# Patient Record
Sex: FEMALE | Employment: FULL TIME | URBAN - METROPOLITAN AREA
[De-identification: names, ages, dates, MRNs, and addresses within clinical notes are randomized per-mention and may not be internally consistent; named-entity substitution may affect disease eponyms.]

---

## 2024-03-01 ENCOUNTER — HOSPITAL ENCOUNTER (OUTPATIENT)
Dept: PREADMISSION TESTING | Age: 39
Discharge: HOME | End: 2024-03-05

## 2024-03-01 VITALS
RESPIRATION RATE: 14 BRPM | WEIGHT: 205.03 LBS | OXYGEN SATURATION: 99 % | SYSTOLIC BLOOD PRESSURE: 125 MMHG | HEIGHT: 71 IN | DIASTOLIC BLOOD PRESSURE: 84 MMHG | HEART RATE: 70 BPM | TEMPERATURE: 98.2 F | BODY MASS INDEX: 28.7 KG/M2

## 2024-03-01 NOTE — PRE-PROCEDURE INSTRUCTIONS
loose, comfortable clothing.  If you are potentially going to have a cast or brace bring clothing that will fit over them.                                                                                                          In case of illness - If you have cold or flu like symptoms (high fever, runny nose, sore throat, cough, etc.) rash, nausea, vomiting, loose stools, and/or recent contact with someone who has a contagious disease (chicken pox, measles, etc.) Please call your doctor before coming to the hospital.         Day of Surgery/Procedure:    As a patient at [facility] you can expect quality medical and nursing care that is centered on your individual needs.  Our goal is to make your surgical experience as comfortable as possible    .  Transportation After Your Surgery/Procedure:    You will need a friend or family member to drive you home after your procedure.  Your  must be 18 years of age or older and able to sign off on your discharge instructions.  A taxi cab or any other form of public transportation is not acceptable.  Your friend or family member must stay at the hospital throughout your procedure.    Someone must remain with you for the first 24 hours after your surgery if you receive anesthesia or medication.  If you do not have someone to stay with you, your procedure may be cancelled.      If you have any other questions regarding your procedure or the day of surgery, please call [phone]      _________________________  ____________________________  Signature (Patient)              Signature (Provider) & date

## 2024-03-15 ENCOUNTER — ANESTHESIA (OUTPATIENT)
Dept: OPERATING ROOM | Age: 39
End: 2024-03-15
Payer: COMMERCIAL

## 2024-03-15 ENCOUNTER — ANESTHESIA EVENT (OUTPATIENT)
Dept: OPERATING ROOM | Age: 39
End: 2024-03-15
Payer: COMMERCIAL

## 2024-03-15 ENCOUNTER — APPOINTMENT (OUTPATIENT)
Dept: GENERAL RADIOLOGY | Age: 39
End: 2024-03-15
Attending: PODIATRIST
Payer: COMMERCIAL

## 2024-03-15 ENCOUNTER — HOSPITAL ENCOUNTER (OUTPATIENT)
Age: 39
Setting detail: OUTPATIENT SURGERY
Discharge: HOME | End: 2024-03-15
Attending: PODIATRIST | Admitting: PODIATRIST
Payer: COMMERCIAL

## 2024-03-15 VITALS
HEIGHT: 71 IN | WEIGHT: 200 LBS | TEMPERATURE: 97.3 F | BODY MASS INDEX: 28 KG/M2 | RESPIRATION RATE: 11 BRPM | SYSTOLIC BLOOD PRESSURE: 121 MMHG | HEART RATE: 57 BPM | OXYGEN SATURATION: 96 % | DIASTOLIC BLOOD PRESSURE: 91 MMHG

## 2024-03-15 DIAGNOSIS — Z98.890 POST-OPERATIVE STATE: Primary | ICD-10-CM

## 2024-03-15 PROCEDURE — 2709999900 HC NON-CHARGEABLE SUPPLY: Performed by: PODIATRIST

## 2024-03-15 PROCEDURE — 2580000003 HC RX 258: Performed by: ANESTHESIOLOGY

## 2024-03-15 PROCEDURE — C1776 JOINT DEVICE (IMPLANTABLE): HCPCS | Performed by: PODIATRIST

## 2024-03-15 PROCEDURE — 2500000003 HC RX 250 WO HCPCS: Performed by: SPECIALIST

## 2024-03-15 PROCEDURE — 73630 X-RAY EXAM OF FOOT: CPT

## 2024-03-15 PROCEDURE — 7100000011 HC PHASE II RECOVERY - ADDTL 15 MIN: Performed by: PODIATRIST

## 2024-03-15 PROCEDURE — 3600000002 HC SURGERY LEVEL 2 BASE: Performed by: PODIATRIST

## 2024-03-15 PROCEDURE — 3600000012 HC SURGERY LEVEL 2 ADDTL 15MIN: Performed by: PODIATRIST

## 2024-03-15 PROCEDURE — 2500000003 HC RX 250 WO HCPCS: Performed by: PODIATRIST

## 2024-03-15 PROCEDURE — 3700000000 HC ANESTHESIA ATTENDED CARE: Performed by: PODIATRIST

## 2024-03-15 PROCEDURE — 2720000010 HC SURG SUPPLY STERILE: Performed by: PODIATRIST

## 2024-03-15 PROCEDURE — 81025 URINE PREGNANCY TEST: CPT

## 2024-03-15 PROCEDURE — 3700000001 HC ADD 15 MINUTES (ANESTHESIA): Performed by: PODIATRIST

## 2024-03-15 PROCEDURE — 7100000010 HC PHASE II RECOVERY - FIRST 15 MIN: Performed by: PODIATRIST

## 2024-03-15 PROCEDURE — 6360000002 HC RX W HCPCS: Performed by: PODIATRIST

## 2024-03-15 PROCEDURE — 6360000002 HC RX W HCPCS: Performed by: SPECIALIST

## 2024-03-15 DEVICE — 9.5MM X 18MM, TAPER POST, MTP    CE
Type: IMPLANTABLE DEVICE | Site: FOOT | Status: FUNCTIONAL
Brand: HEMICAP®

## 2024-03-15 DEVICE — 15MM ARTICULAR COMP,TOE2, 2.5MM X 4.5MM  CE
Type: IMPLANTABLE DEVICE | Site: FOOT | Status: FUNCTIONAL
Brand: HEMICAPDF®

## 2024-03-15 DEVICE — MODULAR INSERT, 1.5MM OFFSET-01(3.6MM THICK)
Type: IMPLANTABLE DEVICE | Site: FOOT | Status: FUNCTIONAL
Brand: HEMICAPDF-P®

## 2024-03-15 DEVICE — FIXATION COMPONENT , DF-P
Type: IMPLANTABLE DEVICE | Site: FOOT | Status: FUNCTIONAL
Brand: HEMICAPDF-P®

## 2024-03-15 RX ORDER — MIDAZOLAM HYDROCHLORIDE 1 MG/ML
INJECTION INTRAMUSCULAR; INTRAVENOUS PRN
Status: DISCONTINUED | OUTPATIENT
Start: 2024-03-15 | End: 2024-03-15 | Stop reason: SDUPTHER

## 2024-03-15 RX ORDER — SODIUM CHLORIDE 0.9 % (FLUSH) 0.9 %
5-40 SYRINGE (ML) INJECTION EVERY 12 HOURS SCHEDULED
Status: DISCONTINUED | OUTPATIENT
Start: 2024-03-15 | End: 2024-03-15 | Stop reason: HOSPADM

## 2024-03-15 RX ORDER — LIDOCAINE HYDROCHLORIDE 20 MG/ML
INJECTION, SOLUTION EPIDURAL; INFILTRATION; INTRACAUDAL; PERINEURAL PRN
Status: DISCONTINUED | OUTPATIENT
Start: 2024-03-15 | End: 2024-03-15 | Stop reason: SDUPTHER

## 2024-03-15 RX ORDER — NALOXONE HYDROCHLORIDE 0.4 MG/ML
INJECTION, SOLUTION INTRAMUSCULAR; INTRAVENOUS; SUBCUTANEOUS PRN
Status: DISCONTINUED | OUTPATIENT
Start: 2024-03-15 | End: 2024-03-15 | Stop reason: HOSPADM

## 2024-03-15 RX ORDER — SODIUM CHLORIDE 9 MG/ML
INJECTION, SOLUTION INTRAVENOUS PRN
Status: DISCONTINUED | OUTPATIENT
Start: 2024-03-15 | End: 2024-03-15 | Stop reason: HOSPADM

## 2024-03-15 RX ORDER — HYDROMORPHONE HYDROCHLORIDE 1 MG/ML
0.5 INJECTION, SOLUTION INTRAMUSCULAR; INTRAVENOUS; SUBCUTANEOUS EVERY 5 MIN PRN
Status: DISCONTINUED | OUTPATIENT
Start: 2024-03-15 | End: 2024-03-15 | Stop reason: HOSPADM

## 2024-03-15 RX ORDER — LIDOCAINE HYDROCHLORIDE 10 MG/ML
1 INJECTION, SOLUTION EPIDURAL; INFILTRATION; INTRACAUDAL; PERINEURAL
Status: DISCONTINUED | OUTPATIENT
Start: 2024-03-15 | End: 2024-03-15 | Stop reason: HOSPADM

## 2024-03-15 RX ORDER — PROPOFOL 10 MG/ML
INJECTION, EMULSION INTRAVENOUS PRN
Status: DISCONTINUED | OUTPATIENT
Start: 2024-03-15 | End: 2024-03-15 | Stop reason: SDUPTHER

## 2024-03-15 RX ORDER — FENTANYL CITRATE 50 UG/ML
25 INJECTION, SOLUTION INTRAMUSCULAR; INTRAVENOUS EVERY 5 MIN PRN
Status: DISCONTINUED | OUTPATIENT
Start: 2024-03-15 | End: 2024-03-15 | Stop reason: HOSPADM

## 2024-03-15 RX ORDER — ONDANSETRON 2 MG/ML
4 INJECTION INTRAMUSCULAR; INTRAVENOUS
Status: DISCONTINUED | OUTPATIENT
Start: 2024-03-15 | End: 2024-03-15 | Stop reason: HOSPADM

## 2024-03-15 RX ORDER — SODIUM CHLORIDE 0.9 % (FLUSH) 0.9 %
5-40 SYRINGE (ML) INJECTION PRN
Status: DISCONTINUED | OUTPATIENT
Start: 2024-03-15 | End: 2024-03-15 | Stop reason: HOSPADM

## 2024-03-15 RX ORDER — SODIUM CHLORIDE, SODIUM LACTATE, POTASSIUM CHLORIDE, CALCIUM CHLORIDE 600; 310; 30; 20 MG/100ML; MG/100ML; MG/100ML; MG/100ML
INJECTION, SOLUTION INTRAVENOUS CONTINUOUS
Status: DISCONTINUED | OUTPATIENT
Start: 2024-03-15 | End: 2024-03-15 | Stop reason: HOSPADM

## 2024-03-15 RX ORDER — SODIUM CHLORIDE 9 MG/ML
INJECTION, SOLUTION INTRAVENOUS CONTINUOUS
Status: DISCONTINUED | OUTPATIENT
Start: 2024-03-15 | End: 2024-03-15 | Stop reason: HOSPADM

## 2024-03-15 RX ORDER — FENTANYL CITRATE 50 UG/ML
INJECTION, SOLUTION INTRAMUSCULAR; INTRAVENOUS PRN
Status: DISCONTINUED | OUTPATIENT
Start: 2024-03-15 | End: 2024-03-15 | Stop reason: SDUPTHER

## 2024-03-15 RX ADMIN — PROPOFOL 50 MCG/KG/MIN: 10 INJECTION, EMULSION INTRAVENOUS at 13:29

## 2024-03-15 RX ADMIN — SODIUM CHLORIDE, POTASSIUM CHLORIDE, SODIUM LACTATE AND CALCIUM CHLORIDE: 600; 310; 30; 20 INJECTION, SOLUTION INTRAVENOUS at 14:58

## 2024-03-15 RX ADMIN — LIDOCAINE HYDROCHLORIDE 100 MG: 20 INJECTION, SOLUTION EPIDURAL; INFILTRATION; INTRACAUDAL; PERINEURAL at 13:27

## 2024-03-15 RX ADMIN — PROPOFOL 50 MG: 10 INJECTION, EMULSION INTRAVENOUS at 13:27

## 2024-03-15 RX ADMIN — MIDAZOLAM 2 MG: 1 INJECTION INTRAMUSCULAR; INTRAVENOUS at 13:23

## 2024-03-15 RX ADMIN — SODIUM CHLORIDE, POTASSIUM CHLORIDE, SODIUM LACTATE AND CALCIUM CHLORIDE: 600; 310; 30; 20 INJECTION, SOLUTION INTRAVENOUS at 12:09

## 2024-03-15 RX ADMIN — Medication 2000 MG: at 13:24

## 2024-03-15 RX ADMIN — FENTANYL CITRATE 50 MCG: 50 INJECTION INTRAMUSCULAR; INTRAVENOUS at 13:27

## 2024-03-15 RX ADMIN — FENTANYL CITRATE 50 MCG: 50 INJECTION INTRAMUSCULAR; INTRAVENOUS at 15:00

## 2024-03-15 ASSESSMENT — PAIN - FUNCTIONAL ASSESSMENT
PAIN_FUNCTIONAL_ASSESSMENT: NONE - DENIES PAIN
PAIN_FUNCTIONAL_ASSESSMENT: 0-10

## 2024-03-15 ASSESSMENT — PAIN DESCRIPTION - DESCRIPTORS: DESCRIPTORS: DULL;SHARP

## 2024-03-15 NOTE — OP NOTE
Operative Note      Patient: Paris Delatorre  YOB: 1985  MRN: [id number]    Date of Procedure: 3/15/2024    Pre-Op Diagnosis Codes:     * Hallux limitus of right foot [M20.5X1]    Post-Op Diagnosis: Same       Procedure(s):  RIGHT 1ST METATARSOPHALANGEAL JOINT REPLACEMENT    Surgeon(s):  Henrik Welch DPM    Assistant:   Resident: Francisco Javier Zavala DPM    Anesthesia: Monitor Anesthesia Care    Estimated Blood Loss (mL): Minimal    Complications: None    Specimens:   * No specimens in log *    Implants:  Implant Name Type Inv. Item Serial No.  Lot No. LRB No. Used Action   COMPONENT TOE L18MM DIA9.5MM MT CE TAPR POST HEMICAP - [id number]  COMPONENT TOE L18MM DIA9.5MM MT CE TAPR POST HEMICAP  ARTHROSURFACE-WD [id number] Right 1 Implanted   COMPONENT FIX DF P HEMICAPDF P - [id number]  COMPONENT FIX DF P HEMICAPDF P  ARTHROSURFACE-WD [id number] Right 1 Implanted   COMPONENT TOE 2.5X4.5MM [id number] OFFSET MT CE ARTC TOE2 - [id number]  COMPONENT TOE 2.5X4.5MM [id number] OFFSET MT CE ARTC TOE2  ARTHROSURFACE-WD [id number] Right 1 Implanted   INSERT TOE 01 25MM OFFSET [id number] MOD HEMICAPDF P - [id number]  INSERT TOE 01 25MM OFFSET [id number] MOD HEMICAPDF P  ARTHROSURFACE-WD [id number] Right 1 Implanted         Drains: * No LDAs found *    Findings: implant 1st mpj right         Detailed Description of Procedure:   INDICATIONS FOR PROCEDURE: Patient is a 39 year old female who has had a progressive hallux rigidus with bunion deformity of the right foot for years.  Preoperative radiographs of the right foot were obtained which demonstrated  hallux valgus deformity, joint space narrowing, subchondral sclerosis of the first metatarsophalangeal joint.  Pain has progressed to the point of limiting ambulation, difficulty in shoe gear, and negatively affecting daily activity.  Patient has failed numerous conservative treatments including anti-inflammatory medication, offloading padding and inserts, shoe gear  subcuticular closure with 5-0 monocryl.  A postoperative injection of 9 cc of 0.5% Marcaine plain plus 1 cc dexamethasone was given.     Dressings consisting of steristrips, Adaptic, gauze 4 x 4's, Kerlix, ACE were then applied.  The pneumatic ankle tourniquet was released and immediate hyperemic flush was noted to all five digits of the right foot. A surgical boot was then applied postoperatively. The patient tolerated the above procedure and anesthesia well without complications. The patient was transported from the operating room to the PACU with vital signs stable and vascular status intact to the foot.      Electronically signed by Francisco Javier Zavala DPM on 3/15/2024 at 4:04 PM

## 2024-03-15 NOTE — ANESTHESIA POSTPROCEDURE EVALUATION
Department of Anesthesiology  Postprocedure Note    Patient: Paris Delatorre  MRN: [id number]  YOB: 1985  Date of evaluation: 3/15/2024    Procedure Summary       Date: 03/15/24 Room / Location: [address]    Anesthesia Start: 1323 Anesthesia Stop: 1516    Procedure: RIGHT 1ST METATARSOPHALANGEAL JOINT REPLACEMENT (Right: Foot) Diagnosis:       Hallux limitus of right foot      (Hallux limitus of right foot [M20.5X1])    Surgeons: Henrik Welch DPM Responsible Provider: Javier Guerin DO    Anesthesia Type: MAC, general ASA Status: 2            Anesthesia Type: No value filed.    Mayi Phase I:      Mayi Phase II: Mayi Score: 10    Anesthesia Post Evaluation    Patient location during evaluation: PACU  Patient participation: complete - patient participated  Level of consciousness: awake and alert  Airway patency: patent  Nausea & Vomiting: no nausea and no vomiting  Cardiovascular status: hemodynamically stable  Respiratory status: acceptable  Hydration status: stable  Pain management: adequate    No notable events documented.

## 2024-03-15 NOTE — ANESTHESIA PRE PROCEDURE
Department of Anesthesiology  Preprocedure Note       Name:  Paris Delatorre   Age:  39 y.o.  :  1985                                          MRN:  [id number]         Date:  3/15/2024      Surgeon: Surgeon(s):  Henrik Welch DPM    Procedure: Procedure(s):  ARTHROSURFACE RIGHT #2 MTPJ    Medications prior to admission:   Prior to Admission medications    Not on File       Current medications:    Current Facility-Administered Medications   Medication Dose Route Frequency Provider Last Rate Last Admin   • lidocaine PF 1 % injection 1 mL  1 mL IntraDERmal Once PRN Milly Gooden MD       • 0.9 % sodium chloride infusion   IntraVENous Continuous Milly Gooden MD       • lactated ringers IV soln infusion   IntraVENous Continuous Mlily Gooden  mL/hr at 03/15/24 1209 New Bag at 03/15/24 1209   • sodium chloride flush 0.9 % injection 5-40 mL  5-40 mL IntraVENous 2 times per day Milly Gooden MD       • sodium chloride flush 0.9 % injection 5-40 mL  5-40 mL IntraVENous PRN Milly Gooden MD       • 0.9 % sodium chloride infusion   IntraVENous PRN Milly Gooden MD       • ceFAZolin (ANCEF) 2000 mg in 0.9% sodium chloride 50 mL IVPB  2,000 mg IntraVENous Once Henrik Welch DPM           Allergies:  No Known Allergies    Problem List:  There is no problem list on file for this patient.      Past Medical History:        Diagnosis Date   • Adult celiac disease    • Asthma     as a child,no inhalers at this time       Past Surgical History:        Procedure Laterality Date   • DILATION AND CURETTAGE OF UTERUS     • ENDOMETRIAL ABLATION     • TUBAL LIGATION     • WISDOM TOOTH EXTRACTION         Social History:    Social History     Tobacco Use   • Smoking status: Former     Current packs/day: 0.00     Average packs/day: 1 pack/day for 22.0 years (22.0 ttl pk-yrs)     Types: Cigarettes     Start date:      Quit date:      Years since quittin.2   • Smokeless tobacco: Never   Substance Use Topics   •

## 2024-03-15 NOTE — BRIEF OP NOTE
Brief Postoperative Note      Patient: Paris Delatorre  YOB: 1985  MRN: [id number]    Date of Procedure: 3/15/2024    Pre-Op Diagnosis Codes:     * Hallux limitus of right foot [M20.5X1]    Post-Op Diagnosis: Same       Procedure(s):  RIGHT 1ST METATARSOPHALANGEAL JOINT REPLACEMENT    Surgeon(s):  Henrik Welch DPM    Assistant:  Resident: Francisco Javier Zavala DPM    Anesthesia: Monitor Anesthesia Care    Estimated Blood Loss (mL): Minimal    Complications: None    Specimens:   * No specimens in log *    Implants:  Implant Name Type Inv. Item Serial No.  Lot No. LRB No. Used Action   COMPONENT TOE L18MM DIA9.5MM MT CE TAPR POST HEMICAP - [id number]  COMPONENT TOE L18MM DIA9.5MM MT CE TAPR POST HEMICAP  ARTHROSURFACE-WD [id number] Right 1 Implanted   COMPONENT FIX DF P HEMICAPDF P - [id number]  COMPONENT FIX DF P HEMICAPDF P  ARTHROSURFACE-WD [id number] Right 1 Implanted   COMPONENT TOE 2.5X4.5MM [id number] OFFSET MT CE ARTC TOE2 - [id number]  COMPONENT TOE 2.5X4.5MM [id number] OFFSET MT CE ARTC TOE2  ARTHROSURFACE-WD [id number] Right 1 Implanted   INSERT TOE 01 25MM OFFSET [id number] MOD HEMICAPDF P - [id number]  INSERT TOE 01 25MM OFFSET [id number] MOD HEMICAPDF P  ARTHROSURFACE-WD [id number] Right 1 Implanted         Drains: * No LDAs found *    Findings: implant 1st mpj right      Electronically signed by Francisco Javier Zavala DPM on 3/15/2024 at 3:18 PM

## 2024-03-15 NOTE — DISCHARGE INSTRUCTIONS
Foot and Ankle Surgery Post Operative Instructions:  You have had a surgical procedure on your right foot.      Fluids and Diet:  Begin with clear liquids, broth, dry toast, and crackers.  If not nauseated then resume your regular pre-operative diet when you are ready  Medications:  Take your prescriptions as directed  You are receiving new prescriptions for pain medications, take as directed  You received nerve block to the foot-this should manage your pain for the first 18hrs post operatively  If your pain is not severe then you may take the non-prescription medication that you normally take for aches and pains ie Tylenol and Ibuprofen (alternating), or if severe pain occurs these will serve as additional medication   You may resume your regularly scheduled medications (unless otherwise directed)  If any side effects or adverse reactions occur, discontinue the medication and contact your doctor.  Review the patient drug information that is provided before you take any medication    Ambulation and Activity:  You are advised to go directly home from the hospital  Use assistance device with either crutches, walker, or knee scooter  We recommend knee scooter if possible, you can also obtain these on [employer] for rather affordable and quickly obtainable.  You may not put weight on the operated foot.  You should wear the surgical shoe at all times when awake. Do not excessively walk on the surgical extremity/ foot.  Avoid stairs.  Do not lift or move heavy objects  Do not drive until cleared by your physician    Bandage and Wound Care Instructions:  Keep bandage clean and dry  Do NOT remove dressing/ splint  DO NOT get wet  Please use shower cover around leg if you do shower so that dressing does not get wet  Do not shower or bathe the operative extremity  Do not remove the bandage (unless otherwise directed)   Do not attempt to put anything between the cast or dressing and your skin, some itching is normal.    Ice  and Elevation:   Elevate operative extremity as much as possible to reduce swelling and discomfort.  Elevate with 2 pillows at or above the level of the heart for the first 72 hours. This is important for post operative swelling and pain  Ice:  Apply Hospital dispensed insulated ice bag over the bandage 20 minutes of every hour while awake for the first 72 hours.  You may ice behind the knee as well.    Special Instructions: Call your doctor immediately if you develop any of the following.  Fever over 100.4 degrees Fahrenheit by mouth - take your temperature daily until your first follow up visit.  Pain not relieved by medication ordered  Swelling, increased redness, warmth, or hardness around operative area.  Numb, tingling or cold toes.  Toe(s) become white or bluish  Bandage becomes wet, soiled, or blood soaked (small amount of bleeding may be normal)  Increased or progressive drainage from surgical area.    Follow up instructions:  You will need to follow up with Henrik Ty, FAHAD within 7-10 days post operatively  Call when you get home to schedule or confirm your appointment.  Call your Podiatrist office if you have any questions or concerns.

## 2024-03-15 NOTE — H&P
History and Physical Service   [facility]    HISTORY AND PHYSICAL EXAMINATION            Date of Evaluation: 3/15/2024  Patient name:  Paris Delatorre  MRN:   [id number]  YOB: 1985  PCP:    No primary care provider on file.    History Obtained From:     Patient, medical records    History of Present Illness:     This is Paris Delatorre a 39 y.o. female who presents today for a ARTHROSURFACE RIGHT #2 MTPJ by Henrik Welch DPM for Hallux limitus of right foot. The patient's chief complaint is right great toe pain that has been present for \"over a decade\" but progressively worsening since January 2023. Per the patient pain is most noticeable when ambulating for long distances and when laying down in bed at night and relieved slightly with topical lidocaine. Denies other interventions. Patient was evaluated by Dr. Welch who recommended surgical intervention. Denies fever, chills, shortness of breath, cough, congestion, wheezing, chest pain, open sores or wounds. Denies hx of diabetes. Denies any current blood thinning medications.     Past Medical History:     Past Medical History:   Diagnosis Date    Adult celiac disease     Asthma     as a child,no inhalers at this time        Past Surgical History:     Past Surgical History:   Procedure Laterality Date    DILATION AND CURETTAGE OF UTERUS      ENDOMETRIAL ABLATION      TUBAL LIGATION      WISDOM TOOTH EXTRACTION          Medications Prior to Admission:     Prior to Admission medications    Not on File        Allergies:     Patient has no known allergies.    Social History:     Tobacco:    reports that she quit smoking about 2 years ago. Her smoking use included cigarettes. She started smoking about 24 years ago. She has a 22.0 pack-year smoking history. She has never used smokeless tobacco.  Alcohol:      reports current alcohol use.  Drug Use:  reports no history of drug use.    Family History:     History reviewed. No pertinent

## (undated) DEVICE — SUTURE VCRL SZ 3-0 L27IN ABSRB UD L19MM PS-2 3/8 CIR PRIM J427H

## (undated) DEVICE — SUTURE VCRL SZ 4-0 L27IN ABSRB UD L19MM PS-2 3/8 CIR PRIM J426H

## (undated) DEVICE — SHOE POSTOP L M 9.5-12 WOM 10.5-13 SQUARED HI ANK STRP RIG

## (undated) DEVICE — BLADE,CARBON-STEEL,15,STRL,DISPOSABLE,TB: Brand: MEDLINE

## (undated) DEVICE — GLOVE SURG SZ 75 CRM LTX FREE POLYISOPRENE POLYMER BEAD ANTI

## (undated) DEVICE — SMALL TEAR CROSS CUT RASP (11.0 X 5.0MM)

## (undated) DEVICE — SKIN PREP TRAY W/CHG: Brand: MEDLINE INDUSTRIES, INC.

## (undated) DEVICE — STRIP,CLOSURE,WOUND,MEDI-STRIP,1/2X4: Brand: MEDLINE

## (undated) DEVICE — SUTURE MCRYL SZ 5-0 L18IN ABSRB UD PC-3 L16MM 3/8 CIR Y844G

## (undated) DEVICE — PRECISION THIN (9.0 X 0.38 X 25.0MM)

## (undated) DEVICE — SOLUTION IV IRRIG 500ML 0.9% SODIUM CHL 2F7123

## (undated) DEVICE — STOCKINETTE,DOUBLE PLY,4X48,STERILE: Brand: MEDLINE

## (undated) DEVICE — MASTISOL ADHESIVE LIQ 2/3ML

## (undated) DEVICE — STAZ LOWER EXTREMITY: Brand: MEDLINE INDUSTRIES, INC.

## (undated) DEVICE — C-ARM: Brand: UNBRANDED

## (undated) DEVICE — PADDING,UNDERCAST,COTTON, 4"X4YD STERILE: Brand: MEDLINE

## (undated) DEVICE — 4-PORT MANIFOLD: Brand: NEPTUNE 2

## (undated) DEVICE — SOLUTION PREP PAINT POV IOD FOR SKIN MUCOUS MEM

## (undated) DEVICE — GLOVE SURG SZ 8 CRM LTX FREE POLYISOPRENE POLYMER BEAD ANTI